# Patient Record
(demographics unavailable — no encounter records)

---

## 2025-06-12 NOTE — PHYSICAL EXAM
[Chaperoned Physical Exam] : A chaperone was present in the examining room during all aspects of the physical examination. [MA] : MA [FreeTextEntry2] : Ronna Connors [Alert] : alert [Appropriately responsive] : appropriately responsive [No Acute Distress] : no acute distress [Oriented x3] : oriented x3 [Examination Of The Breasts] : a normal appearance [No Masses] : no breast masses were palpable [Labia Majora] : normal [Labia Minora] : normal [Normal] : normal [Uterine Adnexae] : normal

## 2025-06-12 NOTE — PLAN
[FreeTextEntry1] : 32yo G0 presenting for GYN annual visit, also with pelvic pain.  #Pelvic pain - Declines upreg in office, will take at home - Has TVUS tomorrow and will send record   #HCM -Pap today -STI testing declined -Contraception using condoms  -S/p HPV vaccine -Discussed exercise/diet - does yoga -Breast self awareness reviewed   Follow up 1 wk to review US results.

## 2025-06-12 NOTE — HISTORY OF PRESENT ILLNESS
[Patient reported PAP Smear was normal] : Patient reported PAP Smear was normal [Y] : Patient is sexually active [No] : Patient does not have concerns regarding sex [Currently Active] : currently active [Men] : men [Yes] : Yes [Condoms] : Condoms [Patient refuses STI testing] : Patient refuses STI testing [FreeTextEntry1] : GYN Annual Visit   Pt is a 30yo G0 presenting for GYN annual visit, also with pelvic pain. Had ovarian cyst diagnosed 2/2024. Thinks may have ruptured a few months ago. Has TVUS appointment scheduled for tomorrow that [employer] ordered for her.    GYNHx: H/o ovarian cyst that she thinks ruptured a few months ago.  H/o HSV1 Denies fibroids/polyps/abnormal paps. S/p HPV vaccine series Sexually active with 1 male partner. Feels safe in current relationship. Contraception condoms - does not want additional method. Plans to TTC in 2026.  Last Pap 2022   OBHx: G0 PMH: Denies PSH: knee surgery SocHx: Social alcohol use, some marijuana use. Denies tobacco use. FamHx: Denies FH breast, colon, GYN cancer Meds: Denies Allergies: NKDA [PapSmeardate] : 2022 [LMPDate] : 6/2/25 [MensesFreq] : 28-30 [MensesLength] : 4-5

## 2025-06-18 NOTE — HISTORY OF PRESENT ILLNESS
[FreeTextEntry1] : Verbal consent given on 06/18/2025 at 9:45am by LOS MCKEON.  Service provided using telehealth. Pt and I are only individuals at the appointment. Pt and I are both in [place].  Pt is a 30yo G0 recently seen for annual GYN visit presenting today for US review and to discuss pelvic pain further. Had history of ovarian cyst. Also has herniated disk in back. At last visit, pap smear notable for suspected BV. Pt has not yet taken home upreg test.   TVUS:   EXAM:  ULTRASOUND PELVIC TRANSABDOMINAL AND TRANSVAGINAL  HISTORY:  Female, 31 years-old with pelvic pain. LMP 6/2/2025.  TECHNIQUE: Complete transabdominal pelvic ultrasound and transvaginal ultrasound gray scale multiplanar images were obtained with high resolution broadband curved transabdominal and endovaginal phased-array transducers supplemented with Color Doppler. Static images are provided for review.  COMPARISON:  None.  FINDINGS:  UTERUS: Anteverted of normal size and echotexture measuring 8.1 x 3.2 x 4.8 cm (65 cc). No discrete fibroids.  ENDOMETRIUM: Normal thickness, echotexture and morphology; 0.6 cm maximum thickness.  RIGHT OVARY: We'll size and morphology measuring 3.2 x 1.9 x 2.8 cm (9 cc). No suspicious findings.  LEFT OVARY: Mildly enlarged measuring 4.1 x 2.6 x 3.7 cm (21 cc). This includes a simple unilocular 2.5 x 1.7 x 2.6 cm dominant follicle.  FREE FLUID: None.   IMPRESSION:   1. The left ovary is mildly enlarged, otherwise unremarkable. 2. The remainder of this study is unremarkable.  Thank you for the opportunity to participate in the care of this patient.     TEMO MAO MD  - Electronically Signed: 06- 8:20 PM

## 2025-06-18 NOTE — PLAN
[FreeTextEntry1] : 30yo G0 recently seen for annual GYN visit presenting today for US review and to discuss pelvic pain further. - Reviewed US in detail with pt. Discussed simple appearing left ovarian cyst but otherwise unremarkable. Discussed no structural cause of pelvic pain noted and that I do not suspect this small cyst is the cause of her pain.  - Discussed broad ddx for pelvic pain which includes GYN, , GI, MSK origin. Discussed suspected BV as possible cause of pain. Flagyl sent to pharmacy.  - Recommended home Upreg - Rec symptom diary if sx continue after BV treatment   RTC if sx continue despite BV treatment. Otherwise return in 1 yr for annual visit.   20 min spent reviewing imaging, obtaining history, counseling pt, and documenting visit.